# Patient Record
Sex: FEMALE | Race: OTHER | HISPANIC OR LATINO
[De-identification: names, ages, dates, MRNs, and addresses within clinical notes are randomized per-mention and may not be internally consistent; named-entity substitution may affect disease eponyms.]

---

## 2018-12-06 ENCOUNTER — APPOINTMENT (OUTPATIENT)
Dept: SURGERY | Facility: CLINIC | Age: 78
End: 2018-12-06
Payer: MEDICARE

## 2018-12-06 VITALS
HEART RATE: 66 BPM | HEIGHT: 64 IN | SYSTOLIC BLOOD PRESSURE: 171 MMHG | DIASTOLIC BLOOD PRESSURE: 88 MMHG | BODY MASS INDEX: 23.9 KG/M2 | TEMPERATURE: 97.8 F | WEIGHT: 140 LBS | OXYGEN SATURATION: 100 %

## 2018-12-06 PROCEDURE — 99203 OFFICE O/P NEW LOW 30 MIN: CPT

## 2018-12-10 ENCOUNTER — OUTPATIENT (OUTPATIENT)
Dept: OUTPATIENT SERVICES | Facility: HOSPITAL | Age: 78
LOS: 1 days | End: 2018-12-10
Payer: MEDICARE

## 2018-12-10 VITALS
DIASTOLIC BLOOD PRESSURE: 87 MMHG | SYSTOLIC BLOOD PRESSURE: 167 MMHG | RESPIRATION RATE: 16 BRPM | WEIGHT: 138.01 LBS | OXYGEN SATURATION: 100 % | HEART RATE: 71 BPM | TEMPERATURE: 98 F | HEIGHT: 59 IN

## 2018-12-10 DIAGNOSIS — Z01.818 ENCOUNTER FOR OTHER PREPROCEDURAL EXAMINATION: ICD-10-CM

## 2018-12-10 DIAGNOSIS — Z98.890 OTHER SPECIFIED POSTPROCEDURAL STATES: Chronic | ICD-10-CM

## 2018-12-10 DIAGNOSIS — N60.02 SOLITARY CYST OF LEFT BREAST: ICD-10-CM

## 2018-12-10 DIAGNOSIS — Z98.49 CATARACT EXTRACTION STATUS, UNSPECIFIED EYE: Chronic | ICD-10-CM

## 2018-12-10 DIAGNOSIS — N60.09 SOLITARY CYST OF UNSPECIFIED BREAST: Chronic | ICD-10-CM

## 2018-12-10 DIAGNOSIS — N32.9 BLADDER DISORDER, UNSPECIFIED: Chronic | ICD-10-CM

## 2018-12-10 DIAGNOSIS — Z90.710 ACQUIRED ABSENCE OF BOTH CERVIX AND UTERUS: Chronic | ICD-10-CM

## 2018-12-10 PROCEDURE — G0463: CPT

## 2018-12-10 RX ORDER — SODIUM CHLORIDE 9 MG/ML
3 INJECTION INTRAMUSCULAR; INTRAVENOUS; SUBCUTANEOUS EVERY 8 HOURS
Qty: 0 | Refills: 0 | Status: DISCONTINUED | OUTPATIENT
Start: 2018-12-14 | End: 2018-12-22

## 2018-12-10 NOTE — H&P PST ADULT - NEGATIVE ENMT SYMPTOMS
no dry mouth/no nasal discharge/no ear pain/no hearing difficulty/no tinnitus/no sinus symptoms/no nasal congestion/no vertigo

## 2018-12-10 NOTE — H&P PST ADULT - PSH
Bladder disorder    Breast cyst  x2, right breast, x1 in left breast  H/O breast biopsy  Left  H/O thyroidectomy    H/O umbilical hernia repair    History of tympanoplasty of right ear    S/P cataract surgery  Bilateral  S/P AYO-BSO

## 2018-12-10 NOTE — H&P PST ADULT - MUSCULOSKELETAL
detailed exam no joint swelling/no joint erythema/ROM intact/normal strength/no joint warmth/no calf tenderness details…

## 2018-12-10 NOTE — H&P PST ADULT - NSANTHOSAYNRD_GEN_A_CORE
No. [place] screening performed.  STOP BANG Legend: 0-2 = LOW Risk; 3-4 = INTERMEDIATE Risk; 5-8 = HIGH Risk

## 2018-12-13 ENCOUNTER — TRANSCRIPTION ENCOUNTER (OUTPATIENT)
Age: 78
End: 2018-12-13

## 2018-12-14 ENCOUNTER — OUTPATIENT (OUTPATIENT)
Dept: OUTPATIENT SERVICES | Facility: HOSPITAL | Age: 78
LOS: 1 days | End: 2018-12-14
Payer: MEDICARE

## 2018-12-14 ENCOUNTER — RESULT REVIEW (OUTPATIENT)
Age: 78
End: 2018-12-14

## 2018-12-14 VITALS
DIASTOLIC BLOOD PRESSURE: 74 MMHG | HEART RATE: 66 BPM | SYSTOLIC BLOOD PRESSURE: 138 MMHG | OXYGEN SATURATION: 98 % | TEMPERATURE: 98 F | RESPIRATION RATE: 16 BRPM

## 2018-12-14 VITALS
WEIGHT: 138.01 LBS | OXYGEN SATURATION: 100 % | RESPIRATION RATE: 16 BRPM | HEART RATE: 58 BPM | TEMPERATURE: 98 F | HEIGHT: 59 IN | DIASTOLIC BLOOD PRESSURE: 66 MMHG | SYSTOLIC BLOOD PRESSURE: 149 MMHG

## 2018-12-14 DIAGNOSIS — N32.9 BLADDER DISORDER, UNSPECIFIED: Chronic | ICD-10-CM

## 2018-12-14 DIAGNOSIS — Z98.890 OTHER SPECIFIED POSTPROCEDURAL STATES: Chronic | ICD-10-CM

## 2018-12-14 DIAGNOSIS — Z90.710 ACQUIRED ABSENCE OF BOTH CERVIX AND UTERUS: Chronic | ICD-10-CM

## 2018-12-14 DIAGNOSIS — Z01.818 ENCOUNTER FOR OTHER PREPROCEDURAL EXAMINATION: ICD-10-CM

## 2018-12-14 DIAGNOSIS — Z98.49 CATARACT EXTRACTION STATUS, UNSPECIFIED EYE: Chronic | ICD-10-CM

## 2018-12-14 DIAGNOSIS — N60.02 SOLITARY CYST OF LEFT BREAST: ICD-10-CM

## 2018-12-14 DIAGNOSIS — N60.09 SOLITARY CYST OF UNSPECIFIED BREAST: Chronic | ICD-10-CM

## 2018-12-14 PROCEDURE — 19281 PERQ DEVICE BREAST 1ST IMAG: CPT | Mod: LT

## 2018-12-14 PROCEDURE — 19281 PERQ DEVICE BREAST 1ST IMAG: CPT

## 2018-12-14 PROCEDURE — 88307 TISSUE EXAM BY PATHOLOGIST: CPT | Mod: 26

## 2018-12-14 PROCEDURE — 88307 TISSUE EXAM BY PATHOLOGIST: CPT

## 2018-12-14 PROCEDURE — 76098 X-RAY EXAM SURGICAL SPECIMEN: CPT | Mod: 26

## 2018-12-14 PROCEDURE — 76098 X-RAY EXAM SURGICAL SPECIMEN: CPT

## 2018-12-14 PROCEDURE — 19125 EXCISION BREAST LESION: CPT | Mod: LT

## 2018-12-14 RX ORDER — ACETAMINOPHEN WITH CODEINE 300MG-30MG
1 TABLET ORAL EVERY 4 HOURS
Qty: 0 | Refills: 0 | Status: DISCONTINUED | OUTPATIENT
Start: 2018-12-14 | End: 2018-12-14

## 2018-12-14 RX ORDER — ACETAMINOPHEN WITH CODEINE 300MG-30MG
1 TABLET ORAL
Qty: 6 | Refills: 0
Start: 2018-12-14

## 2018-12-14 RX ADMIN — SODIUM CHLORIDE 3 MILLILITER(S): 9 INJECTION INTRAMUSCULAR; INTRAVENOUS; SUBCUTANEOUS at 10:04

## 2018-12-14 NOTE — ASU DISCHARGE PLAN (ADULT/PEDIATRIC). - MEDICATION SUMMARY - MEDICATIONS TO TAKE
I will START or STAY ON the medications listed below when I get home from the hospital:    acetaminophen-codeine 300 mg-30 mg oral tablet  -- 1 tab(s) by mouth every 4 hours, As needed, Moderate Pain (4 - 6) MDD:4  -- Indication: For if pain    aspirin 81 mg oral tablet  -- 1 tab(s) by mouth once a day  -- Indication: For ..    atenolol 25 mg oral tablet  -- 1 tab(s) by mouth once a day  -- Indication: For .    Fosamax 35 mg oral tablet  -- 1 tab(s) by mouth once a week  -- Indication: For .    levothyroxine 75 mcg (0.075 mg) oral tablet  -- 1 tab(s) by mouth once a day  -- Indication: For .    Enablex 15 mg oral tablet, extended release  -- 1 tab(s) by mouth once a day  -- Indication: For .    Vitamin D3 2000 intl units oral tablet  -- 1 tab(s) by mouth once a day  -- Indication: For .

## 2018-12-14 NOTE — ASU DISCHARGE PLAN (ADULT/PEDIATRIC). - CONDITIONS AT DISCHARGE
left breast needle loc biopsy pt feels comfortable. denies pain. No active bleeding. Tolerating diet. Vital signs stable. Dressing dry, clean, and intact. Fully awake, alert and oriented x 4

## 2018-12-20 LAB — SURGICAL PATHOLOGY STUDY: SIGNIFICANT CHANGE UP

## 2019-01-04 PROBLEM — E03.9 HYPOTHYROIDISM, UNSPECIFIED: Chronic | Status: ACTIVE | Noted: 2018-12-10

## 2019-01-04 PROBLEM — I10 ESSENTIAL (PRIMARY) HYPERTENSION: Chronic | Status: ACTIVE | Noted: 2018-12-10

## 2019-01-07 ENCOUNTER — APPOINTMENT (OUTPATIENT)
Dept: SURGERY | Facility: CLINIC | Age: 79
End: 2019-01-07
Payer: MEDICARE

## 2019-01-07 PROCEDURE — 99024 POSTOP FOLLOW-UP VISIT: CPT

## 2022-06-11 NOTE — H&P PST ADULT - NEGATIVE NEUROLOGICAL SYMPTOMS
R hip pain
UC flare
no tremors/no headache/no loss of sensation/no difficulty walking/no vertigo/no weakness

## 2022-10-18 NOTE — ASU DISCHARGE PLAN (ADULT/PEDIATRIC). - NS AS DC DISCHARGE ACCOMPANY
Acitretin Pregnancy And Lactation Text: This medication is Pregnancy Category X and should not be given to women who are pregnant or may become pregnant in the future. This medication is excreted in breast milk. Family

## 2023-03-20 ENCOUNTER — APPOINTMENT (OUTPATIENT)
Dept: SURGERY | Facility: CLINIC | Age: 83
End: 2023-03-20
Payer: MEDICARE

## 2023-03-20 VITALS
HEIGHT: 64 IN | DIASTOLIC BLOOD PRESSURE: 83 MMHG | WEIGHT: 143 LBS | BODY MASS INDEX: 24.41 KG/M2 | SYSTOLIC BLOOD PRESSURE: 167 MMHG | HEART RATE: 80 BPM

## 2023-03-20 DIAGNOSIS — N64.4 MASTODYNIA: ICD-10-CM

## 2023-03-20 PROCEDURE — 99203 OFFICE O/P NEW LOW 30 MIN: CPT

## 2023-03-20 NOTE — HISTORY OF PRESENT ILLNESS
[de-identified] :  Patient is a 82 year -old female  who was referred by Dr. Iwona Carranza with the chief complaint of having a Right  breast  pain for 1 week.. She  denies any trauma and She has no nipple discharge. She  denies any fever, night sweats or loss of appetite.    There is no family history of breast carcinoma.   Menarche at age 12 -4  para-4 and her last menstrual period was when she was in her 40s before hysterectomy. Patient is on no hormonal replacement therapy.  she reports her last imaging many years ago\par PERTINENT HISTORY: \par Patient is s/p  Excision of left breast mass with  preop needle localization on 2018. 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 \par \par   [de-identified] : \par \par  .

## 2023-03-20 NOTE — PHYSICAL EXAM
[Alert] : alert [Oriented to Person] : oriented to person [Oriented to Place] : oriented to place [Oriented to Time] : oriented to time [Calm] : calm [de-identified] : She  is alert, well-groomed, and in NAD\par   [de-identified] : anicteric.  Nasal mucosa pink, septum midline. Oral mucosa pink.  Tongue midline, Pharynx without exudates.\par   [de-identified] : Neck supple. Trachea midline. Thyroid isthmus barely palpable, lobes not felt.\par   [de-identified] : left breast periareolar well healed scar. No chest deformity. Breast are symmetric, Normal contours. No nodules, masses, tenderness, or axillary or supraclavicular  adenopathy. No nipple discharge. no skin retraction \par

## 2023-03-20 NOTE — PLAN
[FreeTextEntry1] : Ms. COOL  is presenting  today for an evaluation .  she is doing well and offers no complaints.  Results of  her recent  imaging and physical examination findings were discussed in details.   She  was advised to have BL             breast US and Mammogram and return after the tests. Importance of monthly self-breast examination was reinforced.  Patient's questions and concerns addressed to patient's satisfaction.\par \par Vitamin E daily for breast pain \par Avoid caffein and Chocolates to prevent breast pain

## 2023-03-20 NOTE — CONSULT LETTER
[Dear  ___] : Dear  [unfilled], [Consult Letter:] : I had the pleasure of evaluating your patient, [unfilled]. [Please see my note below.] : Please see my note below. [Consult Closing:] : Thank you very much for allowing me to participate in the care of this patient.  If you have any questions, please do not hesitate to contact me. [Sincerely,] : Sincerely, [FreeTextEntry3] : Srinivasa Solitario MD, FACS

## 2023-04-17 ENCOUNTER — APPOINTMENT (OUTPATIENT)
Dept: SURGERY | Facility: CLINIC | Age: 83
End: 2023-04-17
Payer: MEDICARE

## 2023-04-17 VITALS
BODY MASS INDEX: 23.9 KG/M2 | HEIGHT: 64 IN | HEART RATE: 64 BPM | WEIGHT: 140 LBS | DIASTOLIC BLOOD PRESSURE: 82 MMHG | SYSTOLIC BLOOD PRESSURE: 165 MMHG

## 2023-04-17 PROCEDURE — 99213 OFFICE O/P EST LOW 20 MIN: CPT

## 2023-04-17 NOTE — DATA REVIEWED
[FreeTextEntry1] : Patient Name\par JENNIFER COOL\par Date of Birth\par 1940\par Procedure\par MA 3D DIGITAL SCREENING MAMMOGRAPHY\par Study Date & Time\par 2023-04-04 4:37 PM\par Patient ID\par [id number]\par Accession Number\par [id number]\par Referring Physician\par FRANCISCO GALICIA\par Institution Name\par [facility]\par Report\par RADIOLOGY REPORT\par FINDINGS\par FINDING\par EXAM: MA 3D DIGITAL SCREENING MAMMOGRAPHY, US SCREENING BREAST BILATERAL\par Indication: Screening. History of multiple sonographic findings.\par Date of last clinical exam: Longer than one year ago\par Breast Cancer Risk:\par NCI 5 year:2.2%\par NCI Lifetime:2.7%\par Comparison: Comparison is made to prior studies dating back to 4/28/2015.\par 3D tomosynthesis CC and MLO digital mammographic views of the breast were obtained. Composite views\par (c-views) were acquired. The images were processed with computer-aided detection system.\par The breast tissue is heterogeneously dense, which may obscure small masses. Bilateral breast masses\par are again seen including adjacent masses in the superior right breast. Nodular asymmetry associated\par with coarse calcification in the left breast is stable. Nodular asymmetry in the medial right breast\par appears larger and may correspond to sonographic finding at 3:00. Nodular asymmetry in the superior\par left breast may correspond to 12:00 periareolar finding. There is no new dominant mass, suspicious\par microcalcification or architectural distortion in the left breast. The axillary regions are within\par normal limits.\par Subsequent bilateral breast ultrasound was performed with a linear transducer in a round the clock\par fashion including the axilla.\par Right breast:\par 12:00, periareolar region, new mass is seen with angular margin and associated vascularity, this is\par suspicious, recommend sampling with ultrasound-guided core biopsy\par 3:00, periareolar region, 7 mm mass is taller than wide biopsy is recommended.\par 9:00, periareolar region, 9 mm mass is stable\par 9:00 3 cm from the nipple 5 mm mass is stable and adjacent mass also stable\par 11:00, periareolar region 7 mm probable cyst with debris\par 4/14/23, 9:34 AM [employer]\par [email or web address]\par Left breast:\par 12:00 periareolar region, 4 mm mass is stable\par 3:00 2 cm from the nipple, 7 mm mass is stable\par 3:00, 1 cm from the nipple, 8 mm mass is larger\par 4:00 periareolar region, 4 mm probable cyst with debris is essentially stable\par 6:00 periareolar region, 1 cm mass is stable\par 8:00 2 cm from the nipple, 7 mm mass stable\par Impression:\par Right breast mass 12:00 periareolar region reflects a change. Recommend sampling with\par ultrasound-guided core biopsy.\par Right breast mass 3:00 slightly taller than wide recommend ultrasound-guided core biopsy.\par These may correspond to nodular asymmetries on mammogram right breast.\par Multiple additional breast masses which may also reflect papilloma but essentially stable. Left\par breast 3:00 slightly larger, recommend short-term follow-up if biopsy results are benign.\par BI-RADS CATEGORY: 4-suspicious\par Recommendation: Ultrasound Biopsy Right breast 12:00 and 3:00 with clip marker placement and\par postprocedure mammogram. Diagnostic evaluation right breast mass at 3:00 recommended at that time to\par assess the need for biopsy. Short-term follow-up of additional sonographic findings including left\par breast 3:00.\par Message left with Ngozi in Dr. FRANCISCO GALICIA MD's office on 4/6/2023 10:38 AM\par A letter has been sent to the patient with the above recommendations. Patient has been added into a\par reminder system with a target due date for their next mammogram.\par Approximately 10% of breast carcinomas are not radiographically detectable. A negative report should\par not delay biopsy if a clinically suspicious mass is present. Dense breasts may obscure an underlying\par neoplasm. If NCI risk has been calculated, the results are based on information provided by the\par patient.\par BIRADS 0: Incomplete - Need Additional Imaging Evaluation and/or Prior Mammograms for Comparison\par BIRADS 1: Negative\par BIRADS 2: Benign\par BIRADS 3: Probably Benign\par BIRADS 4a: Low Suspicion of Malignancy\par BIRADS 4b: Intermediate Suspicion of Malignancy\par BIRADS 4c: Moderately Suspicious of Malignancy\par BIRADS 5: Highly Suspicious of Malignancy\par BIRADS 6: Known Malignancy\par The [place] Department of Health requires us to indicate the date of the patient's last\par clinical breast examination.\par Electronically signed by: Jacqui Rapp MD 4/6/2023 10:41 AM\par Workstation: [id number]\par 4/14/23, 9:34 AM [employer]\par [email or web address] 3/3\par Electronically Signed By: Jacqui Rapp MD\par Sign Date: 06-APR-23[facility]

## 2023-04-17 NOTE — PHYSICAL EXAM
[Alert] : alert [Oriented to Person] : oriented to person [Oriented to Place] : oriented to place [Oriented to Time] : oriented to time [Calm] : calm [de-identified] : She  is alert, well-groomed, and in NAD\par   [de-identified] : anicteric.  Nasal mucosa pink, septum midline. Oral mucosa pink.  Tongue midline, Pharynx without exudates.\par   [de-identified] : Neck supple. Trachea midline. Thyroid isthmus barely palpable, lobes not felt.\par   [de-identified] : left breast periareolar well healed scar. No chest deformity. Breast are symmetric, Normal contours. No nodules, masses, tenderness, or axillary or supraclavicular  adenopathy. No nipple discharge. no skin retraction

## 2023-04-17 NOTE — HISTORY OF PRESENT ILLNESS
[de-identified] : This is  a 82 year   old patient presenting today for a breast exam and to discuss the results of her recent  breast imaging. Patient had a BL breast US and   BL breast Mammogram on 2023 Deemed BIRADS category 4 and showed Right breast mass 12:00 periareolar region mass and Right breast mass 3:00 slightly taller than wide.   ultrasound-guided core biopsy recommended for both suspicious areas. Ms. COOL denies any trauma and she has no nipple discharge. Patient denies any fever, night sweats or loss of appetite. \par \par PERTINENT HISTORY: \par Patient is s/p Excision of left breast mass with preop needle localization on 2018. 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  There is no family history of breast carcinoma. Menarche at age 12 -4 para-4 and her last menstrual period was when she was in her 40s before hysterectomy. Patient is on no hormonal replacement therapy.

## 2023-04-17 NOTE — PLAN
[FreeTextEntry1] : Ms. COOL  is presenting  today for an evaluation .  she is doing well and offers no complaints.  Results of  her recent  imaging and physical examination findings were discussed in details.   She  was advised to have US guided    Right     breast  biopsy x 2 and return after the tests. Importance of monthly self-breast examination was reinforced.  Patient's questions and concerns addressed to patient's satisfaction.\par \par

## 2023-05-09 ENCOUNTER — NON-APPOINTMENT (OUTPATIENT)
Age: 83
End: 2023-05-09

## 2023-05-22 ENCOUNTER — APPOINTMENT (OUTPATIENT)
Dept: SURGERY | Facility: CLINIC | Age: 83
End: 2023-05-22

## 2023-06-01 ENCOUNTER — APPOINTMENT (OUTPATIENT)
Dept: SURGERY | Facility: CLINIC | Age: 83
End: 2023-06-01
Payer: MEDICARE

## 2023-06-01 VITALS
DIASTOLIC BLOOD PRESSURE: 83 MMHG | HEART RATE: 66 BPM | HEIGHT: 61 IN | SYSTOLIC BLOOD PRESSURE: 157 MMHG | WEIGHT: 143 LBS | BODY MASS INDEX: 27 KG/M2

## 2023-06-01 PROCEDURE — 99213 OFFICE O/P EST LOW 20 MIN: CPT

## 2023-06-01 NOTE — DATA REVIEWED
[FreeTextEntry1] : Patient Name\par JENNIFER COOL\par Date of Birth\par 1940\par Procedure\par US BREAST BIOPSY\par Study Date & Time\par 2023-04-27 10:35 AM\par Patient ID\par [id number]\par Accession Number\par [id number]\par Referring Physician\par FRANCISCO GALICIA\par Institution Name\par [facility]\par Report\par RADIOLOGY REPORT\par FINDINGS\par FINDING\par EXAMS: US BREAST BIOPSY, MA 3D DIGITAL DIAGNOSTIC MAMMOGRAPHY (RIGHT BREAST)\par HISTORY: 83 years old female with 2 indeterminate lesions in the right breast. The patient presents\par for ultrasound guided core biopsy.\par PROCEDURE: Ultrasound guided core biopsy of the right breast (2 sites)\par The risks, benefits and alternatives to the procedure were discussed with the patient and the\par patient's questions were answered. Written informed consent was obtained.\par Targeted ultrasound redemonstrates indeterminate hypoechoic lesions at right 12:00 periareolar and\par right 3:00 periareolar, both irregular in shape. The lesions were targeted for ultrasound-guided\par biopsy.\par The 3:00 periareolar lesion was first targeted. The patient was positioned and the breast was\par prepped for the procedure using standard aseptic technique. Local anesthesia was achieved with\par injection of 1% lidocaine in the skin/superficial tissues and the deeper tissues. A 14-gauge spring\par activated biopsy device was advanced to the preselected target through a 13-gauge coaxial guide. 4\par biopsy specimens were obtained. The tissue specimens were submitted to pathology. A coil clip marker\par was then deployed at the biopsy site.\par The right 12:00 periareolar lesion was next sampled using identical technique to that described\par above. 3 biopsy specimens were obtained. A venus clip marker was deployed at the biopsy site.\par Please note that samples from each site were inadvertently combined into one specimen jar. This was\par discussed with pathology and they were advised to run and result each sample individually.\par Post procedure digital mammogram demonstrates both markers in their expected locations with the coil\par clip in the region of the previously described medial nodular asymmetry confirming correspondence.\par The patient tolerated the procedure well without immediate complication. The sites of biopsy were\par held in compression for several minutes and the needle entrance site was closed using Steri strips.\par Pain control was adequate and the patient was assessed and appropriate for discharge. Home care\par instructions for this procedure were reviewed with the patient. All patient questions were answered.\par \par IMPRESSION:\par Ultrasound guided core biopsy of 2 lesions in the right breast. Please note that specimens from each\par site were inadvertently combined into one pathology specimen jar. Pathology was notified and\par requested to analyze and result each of 7 samples individually. One sample likely fragmented, as 8\par samples were resulted below:\par Final pathology results:\par A: Sclerosed intraductal papilloma\par B: Sclerosed intraductal papilloma\par C: Sclerosed intraductal papilloma\par D: Benign breast tissue with stromal fibrosis, focally with features suggestive of sclerosed\par intraductal papilloma\par E: Sclerosed intraductal papilloma\par F: Sclerosed intraductal papilloma\par G: Benign breast tissue with stromal fibrosis, focally with features suggestive of sclerosing\par intraductal papilloma\par H: Sclerosed intraductal papilloma\par Pathology shows atypical/high risk findings at both sites, despite inadvertent combination of the\par samples. The results are concordant with imaging at each site.\par Please see separate pathology report for additional details.\par Pathology results are concordant with imaging findings at each site.\par BI-RADS CATEGORY: 4 - Suspicious Abnormality\par RECOMMENDATION:\par Recommendation: Surgical consultation recommended at each site due to the presence of sclerosed\par intraductal papillomas. Sampling of additional findings at right 9:00 periareolar and left 3:00, 1\par cm from the nipple is recommended prior to any surgical intervention.\par Findings were phoned to the office of Dr. Galicia and were discussed with nurse practitioner\par Tere.\par Electronically signed by: Paradise Donato MD 5/3/2023 2:56 PM\par Workstation: [id number]\par Electronically Signed By: Paradise Donato MD\par Sign Date: 03-MAY-23

## 2023-06-01 NOTE — PHYSICAL EXAM
[Alert] : alert [Oriented to Person] : oriented to person [Oriented to Place] : oriented to place [Oriented to Time] : oriented to time [Calm] : calm [de-identified] : She  is alert, well-groomed, and in NAD\par   [de-identified] : anicteric.  Nasal mucosa pink, septum midline. Oral mucosa pink.  Tongue midline, Pharynx without exudates.\par   [de-identified] : Neck supple. Trachea midline. Thyroid isthmus barely palpable, lobes not felt.\par   [de-identified] : left breast periareolar well healed scar. No chest deformity. Breast are symmetric, Normal contours. No nodules, masses, tenderness, or axillary or supraclavicular  adenopathy. No nipple discharge. no skin retraction

## 2023-06-01 NOTE — PLAN
[FreeTextEntry1] : Ms. COOL  is presenting  today for an evaluation .  she is doing well and offers no complaints.  Results of  her recent  Biopsy and physical examination findings were discussed in details.   She  was advised to have BL              breast US guided biopsy  and return after the tests. Importance of monthly self-breast examination was reinforced.  Patient's questions and concerns addressed to patient's satisfaction.\par

## 2023-06-01 NOTE — ASSESSMENT
[FreeTextEntry1] : Impression: right breast masses - intraductal papilloma as per biopsy results\par patient has additional areas of concern in the BL breast

## 2023-06-01 NOTE — HISTORY OF PRESENT ILLNESS
[de-identified] : This is a 82 year old patient presenting today for a breast exam and to discuss the results of her recent breast imaging. Patient had a BL breast US and BL breast Mammogram on 2023 Deemed BIRADS category 4 and showed Right breast mass 12:00 periareolar region mass and Right breast mass 3:00 slightly taller than wide. Ms. COOL  is s/p US guided right breast biopsy  2 lesions  on 2023. As per report   specimens from each site were inadvertently combined into one pathology specimen jar.  Patient's pathology results were  consistent with Sclerosed intraductal papilloma. patient was also found to have additional findings at right 9:00 periareolar and left 3:00, 1 cm from the nipple.   Ms. COOL denies any trauma and she has no nipple discharge. Patient denies any fever, night sweats or loss of appetite.   \par \par PERTINENT HISTORY: \par Patient is s/p Excision of left breast mass with preop needle localization on 2018. 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 There is no family history of breast carcinoma. Menarche at age 12 -4 para-4 and her last menstrual period was when she was in her 40s before hysterectomy. Patient is on no hormonal replacement therapy. \par  [de-identified] :  Patient reports no interval changes to her overall health status or medical history

## 2023-06-26 ENCOUNTER — APPOINTMENT (OUTPATIENT)
Dept: SURGERY | Facility: CLINIC | Age: 83
End: 2023-06-26
Payer: MEDICARE

## 2023-06-26 VITALS
DIASTOLIC BLOOD PRESSURE: 78 MMHG | BODY MASS INDEX: 26.62 KG/M2 | HEIGHT: 61 IN | WEIGHT: 141 LBS | HEART RATE: 71 BPM | SYSTOLIC BLOOD PRESSURE: 153 MMHG

## 2023-06-26 PROCEDURE — 99213 OFFICE O/P EST LOW 20 MIN: CPT

## 2023-06-26 NOTE — HISTORY OF PRESENT ILLNESS
[de-identified] : This is a 82 year old patient presenting today for a breast exam and to discuss the results of her recent breast imaging. Patient had a BL breast US and BL breast Mammogram on 2023 Deemed BIRADS category 4 and showed Right breast mass 12:00 periareolar region mass and Right breast mass 3:00 slightly taller than wide. Ms. COOL  is s/p US guided right breast biopsy  2 lesions  on 2023. As per report   specimens from each site were inadvertently combined into one pathology specimen jar.  Patient's pathology results were  consistent with Sclerosed intraductal papilloma. patient was also found to have additional findings at right 9:00 periareolar and left 3:00, 1 cm from the nipple.   Ms. COOL  is s/p US guided biopsy right breast and left breast on 06/15/2023. Patient's pathology results were  consistent with Right 9:00: Sclerosing intraductal papilloma, previous biopsy site changes  and Left 3:00: Sclerosing papillary lesion.  Ms. COOL denies any trauma and she has no nipple discharge. Patient denies any fever, night sweats or loss of appetite. \par \par PERTINENT HISTORY: \par Patient is s/p Excision of left breast mass with preop needle localization on 2018. 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 There is no family history of breast carcinoma. Menarche at age 12 -4 para-4 and her last menstrual period was when she was in her 40s before hysterectomy. Patient is on no hormonal replacement therapy. \par  [de-identified] : Patient reports no interval changes to her overall health status or medical history

## 2023-06-26 NOTE — PLAN
[FreeTextEntry1] : Ms. COOL  was told significance of findings, options, risks and benefits were explained.  Informed consent for excision right breast mass with spot localization x3 and excision left breast mass with spot localization and potential risks, benefits and alternatives (surgical options were discussed including non-surgical options or the option of no surgery) to the planned surgery were discussed in depth.  All surgical options were discussed including non-surgical treatments.  She wishes to proceed with surgery.  We will plan for surgery on at the next available date, pending any required insurance pre-certification or pre-approval. She agrees to obtain any necessary pre-operative evaluations and testing prior to surgery.\par Patient advised to seek immediate medical attention with any acute change in symptoms or with the development of any new or worsening symptoms.  Patient's questions and concerns addressed to patient's satisfaction, and patient verbalized an understanding of the information discussed.\par \par

## 2023-06-26 NOTE — DATA REVIEWED
[FreeTextEntry1] : Patient Name\par JENNIFER COOL\par Date of Birth\par 1940\par Procedure\par US BREAST BIOPSY\par Study Date & Time\par 2023-06-15 11:48 AM\par Patient ID\par [id number]\par Accession Number\par [id number]\par Referring Physician\par FRANCISCO GALICIA\par Institution Name\par [id number]\par Report\par RADIOLOGY REPORT\par FINDINGS\par FINDING\par EXAMS: US BREAST BIOPSY, MA 3D DIGITAL DIAGNOSTIC MAMMOGRAPHY, US BREAST BIOPSY (bilateral, 2 sites)\par HISTORY: 83 years old female with recently biopsied bilateral papillomas. The patient presents for\par ultrasound guided core biopsy of 2 additional lesions.\par PROCEDURE: Ultrasound guided core biopsy of the bilateral breast (2 sites)\par The risks, benefits and alternatives to the procedure were discussed with the patient and the\par patient's questions were answered. Written informed consent was obtained.\par Targeted ultrasound redemonstrates the hypoechoic nodules at right 9:00 periareolar and left 3:00, 1\par cm from the nipple. The lesions were targeted for ultrasound-guided biopsy.\par The left 3:00 lesion was first targeted. The patient was positioned and the breast was prepped for\par the procedure using standard aseptic technique. Local anesthesia was achieved with injection of 1%\par lidocaine in the skin/superficial tissues and the deeper tissues. A 14-gauge spring activated biopsy\par device was advanced to the preselected target through a 13-gauge coaxial guide. Biopsy specimens\par were obtained. The tissue specimens were submitted to pathology. A heart shaped clip marker was then\par deployed at the biopsy site.\par The right 9:00 lesion was next targeted and sampled using an identical technique to that described\par above. A heart shaped clip marker was deployed at the biopsy site.\par Post procedure digital mammogram demonstrates the both heart shaped clip markers in their expected\par locations\par The patient tolerated the procedure well without immediate complication. The site of biopsy was held\par in compression for several minutes and the needle entrance site was closed using Steri strips. Pain\par control was adequate and the patient was assessed and appropriate for discharge. Home care\par instructions for this procedure were reviewed with the patient. All patient questions were answered.\par IMPRESSION:\par \par Ultrasound guided core biopsy of 2 additional lesions bilaterally.\par Final pathology results:\par Right 9:00: Sclerosing intraductal papilloma, previous biopsy site changes\par Left 3:00: Sclerosing papillary lesion\par Pathology shows atypical/high risk findings. The results are concordant with imaging.\par Please see separate pathology report for additional details.\par Pathology results are concordant with imaging findings.\par BI-RADS CATEGORY: 4 - Suspicious Abnormality\par RECOMMENDATION:\par Recommendation: Surgical consultation for these biopsied lesions as well as previously biopsied\par papillomas previously described.\par Electronically signed by: Paradise Donato MD 6/21/2023 1:47 PM\par Workstation: [id number]\par Electronically Signed By: Paradise Donato MD\par Sign Date: 21-JUN-23[facility]

## 2023-06-26 NOTE — PHYSICAL EXAM
[Alert] : alert [Oriented to Person] : oriented to person [Oriented to Place] : oriented to place [Oriented to Time] : oriented to time [Calm] : calm [de-identified] : She  is alert, well-groomed, and in NAD\par   [de-identified] : anicteric.  Nasal mucosa pink, septum midline. Oral mucosa pink.  Tongue midline, Pharynx without exudates.\par   [de-identified] : Neck supple. Trachea midline. Thyroid isthmus barely palpable, lobes not felt.\par   [de-identified] : left breast periareolar well healed scar. No chest deformity. Breast are symmetric, Normal contours. No nodules, masses, tenderness, or axillary or supraclavicular  adenopathy. No nipple discharge. no skin retraction

## 2023-06-26 NOTE — ASSESSMENT
[FreeTextEntry1] : Impression: right breast masses - intraductal papilloma x3 \par  left breast mass- intraductal papilloma x1

## 2023-07-17 ENCOUNTER — OUTPATIENT (OUTPATIENT)
Dept: OUTPATIENT SERVICES | Facility: HOSPITAL | Age: 83
LOS: 1 days | End: 2023-07-17
Payer: MEDICARE

## 2023-07-17 VITALS
TEMPERATURE: 98 F | HEART RATE: 69 BPM | RESPIRATION RATE: 18 BRPM | DIASTOLIC BLOOD PRESSURE: 67 MMHG | OXYGEN SATURATION: 98 % | SYSTOLIC BLOOD PRESSURE: 148 MMHG | HEIGHT: 62 IN | WEIGHT: 141.1 LBS

## 2023-07-17 DIAGNOSIS — E03.9 HYPOTHYROIDISM, UNSPECIFIED: ICD-10-CM

## 2023-07-17 DIAGNOSIS — N32.9 BLADDER DISORDER, UNSPECIFIED: Chronic | ICD-10-CM

## 2023-07-17 DIAGNOSIS — Z98.890 OTHER SPECIFIED POSTPROCEDURAL STATES: Chronic | ICD-10-CM

## 2023-07-17 DIAGNOSIS — I10 ESSENTIAL (PRIMARY) HYPERTENSION: ICD-10-CM

## 2023-07-17 DIAGNOSIS — Z01.818 ENCOUNTER FOR OTHER PREPROCEDURAL EXAMINATION: ICD-10-CM

## 2023-07-17 DIAGNOSIS — N63.20 UNSPECIFIED LUMP IN THE LEFT BREAST, UNSPECIFIED QUADRANT: ICD-10-CM

## 2023-07-17 DIAGNOSIS — N60.09 SOLITARY CYST OF UNSPECIFIED BREAST: Chronic | ICD-10-CM

## 2023-07-17 DIAGNOSIS — R00.2 PALPITATIONS: ICD-10-CM

## 2023-07-17 DIAGNOSIS — Z90.710 ACQUIRED ABSENCE OF BOTH CERVIX AND UTERUS: Chronic | ICD-10-CM

## 2023-07-17 DIAGNOSIS — Z98.49 CATARACT EXTRACTION STATUS, UNSPECIFIED EYE: Chronic | ICD-10-CM

## 2023-07-17 RX ORDER — ALENDRONATE SODIUM 70 MG/1
1 TABLET ORAL
Qty: 0 | Refills: 0 | DISCHARGE

## 2023-07-17 RX ORDER — DARIFENACIN HYDROBROMIDE 15 MG/1
1 TABLET, EXTENDED RELEASE ORAL
Qty: 0 | Refills: 0 | DISCHARGE

## 2023-07-17 NOTE — H&P PST ADULT - NSICDXPASTMEDICALHX_GEN_ALL_CORE_FT
PAST MEDICAL HISTORY:  Hypertension     Hypothyroidism     Intraductal papilloma of both breasts     Osteoporosis     Palpitations

## 2023-07-17 NOTE — H&P PST ADULT - REASON FOR ADMISSION
Pt is scheduled for Excision of Right Breast Masses with Pre-Operative Needle Localization x3, and Excision of Left Breast Mass with Pre-Operative Needle Localization on 7/28/23 with Dr. Solitario

## 2023-07-17 NOTE — H&P PST ADULT - HISTORY OF PRESENT ILLNESS
84 y/o female Palpitations, Osteoporosis, Hypothyroidism. 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 Pt c/o recent bilateral breast ultrasound and mammogram that showed right and left breast masses s/p US guided biopsy on 06/15/2023 showing right breast masses with Sclerosing intraductal papilloma x3 and left breast mass with Sclerosing intraductal papilloma x1. Pt is now scheduled for Excision of Right Breast Mass with Pre-operative Needle Localization x3 and Excision of Left Breast Mass with Pre-operative Needle Localization on 7/28/23 with Dr. Solitario.

## 2023-07-17 NOTE — H&P PST ADULT - NSICDXFAMILYHX_GEN_ALL_CORE_FT
FAMILY HISTORY:  Father  Still living? No  Family history of typhoid fever, Age at diagnosis: Age Unknown

## 2023-07-17 NOTE — H&P PST ADULT - NSICDXPROCEDURE_GEN_ALL_CORE_FT
PROCEDURES:  Excision, mass, breast, bilateral, with needle localization 17-Jul-2023 10:18:22  Clifton Bobo

## 2023-07-17 NOTE — H&P PST ADULT - PROBLEM SELECTOR PLAN 1
Pt is scheduled for Excision of Right Breast Mass with Pre-operative Needle Localization x3 and Excision of Left Breast Mass with Pre-operative Needle Localization on 7/28/23 with Dr. Solitario.     Labs drawn in PCP 07/2023- follow up for results  pending medical clearance     Pt was  instructed to stop aspirin/ecotrin and all over the counter medication including vitamins and herbal supplements one week prior to surgery   Instructions given on the use of 4% chlorhexidine wash and Pt verbalized understanding of same   Pt Instructed to have nothing by mouth starting midnight day before surgery  Patient is to expect a phone call day before surgery between the hours of 430- 630pm giving arrival time for surgery   Written and verbal preoperative instructions given to patient with understanding verbalized.     Patient today with STOP bang score 2  Low  risk for CHEYENNE

## 2023-07-17 NOTE — H&P PST ADULT - ASSESSMENT
82 y/o female found to have right breast masses with Sclerosing intraductal papilloma x3 and left breast mass with Sclerosing intraductal papilloma x1. Pt is now scheduled for Excision of Right Breast Mass with Pre-operative Needle Localization x3 and Excision of Left Breast Mass with Pre-operative Needle Localization on 7/28/23 with Dr. Solitario.     SLOAN GONZÁLES 2

## 2023-07-17 NOTE — H&P PST ADULT - NSICDXPASTSURGICALHX_GEN_ALL_CORE_FT
PAST SURGICAL HISTORY:  Bladder disorder     Breast cyst x2, right breast, x1 in left breast    H/O breast biopsy Left    H/O thyroidectomy     H/O umbilical hernia repair     History of tympanoplasty of right ear     S/P cataract surgery Bilateral    S/P AYO-BSO

## 2023-07-18 PROCEDURE — G0463: CPT

## 2023-07-27 ENCOUNTER — TRANSCRIPTION ENCOUNTER (OUTPATIENT)
Age: 83
End: 2023-07-27

## 2023-07-28 ENCOUNTER — APPOINTMENT (OUTPATIENT)
Dept: SURGERY | Facility: HOSPITAL | Age: 83
End: 2023-07-28

## 2023-07-28 ENCOUNTER — OUTPATIENT (OUTPATIENT)
Dept: OUTPATIENT SERVICES | Facility: HOSPITAL | Age: 83
LOS: 1 days | End: 2023-07-28
Payer: MEDICARE

## 2023-07-28 ENCOUNTER — TRANSCRIPTION ENCOUNTER (OUTPATIENT)
Age: 83
End: 2023-07-28

## 2023-07-28 ENCOUNTER — RESULT REVIEW (OUTPATIENT)
Age: 83
End: 2023-07-28

## 2023-07-28 VITALS
RESPIRATION RATE: 15 BRPM | HEART RATE: 59 BPM | SYSTOLIC BLOOD PRESSURE: 148 MMHG | TEMPERATURE: 98 F | OXYGEN SATURATION: 96 % | DIASTOLIC BLOOD PRESSURE: 64 MMHG

## 2023-07-28 VITALS
SYSTOLIC BLOOD PRESSURE: 146 MMHG | RESPIRATION RATE: 16 BRPM | WEIGHT: 141.1 LBS | HEART RATE: 67 BPM | TEMPERATURE: 98 F | DIASTOLIC BLOOD PRESSURE: 75 MMHG | OXYGEN SATURATION: 95 % | HEIGHT: 62 IN

## 2023-07-28 DIAGNOSIS — Z90.710 ACQUIRED ABSENCE OF BOTH CERVIX AND UTERUS: Chronic | ICD-10-CM

## 2023-07-28 DIAGNOSIS — Z98.890 OTHER SPECIFIED POSTPROCEDURAL STATES: Chronic | ICD-10-CM

## 2023-07-28 DIAGNOSIS — N60.09 SOLITARY CYST OF UNSPECIFIED BREAST: Chronic | ICD-10-CM

## 2023-07-28 DIAGNOSIS — Z98.49 CATARACT EXTRACTION STATUS, UNSPECIFIED EYE: Chronic | ICD-10-CM

## 2023-07-28 DIAGNOSIS — N63.20 UNSPECIFIED LUMP IN THE LEFT BREAST, UNSPECIFIED QUADRANT: ICD-10-CM

## 2023-07-28 DIAGNOSIS — N32.9 BLADDER DISORDER, UNSPECIFIED: Chronic | ICD-10-CM

## 2023-07-28 PROCEDURE — ZZZZZ: CPT

## 2023-07-28 PROCEDURE — 19282 PERQ DEVICE BREAST EA IMAG: CPT

## 2023-07-28 PROCEDURE — 88305 TISSUE EXAM BY PATHOLOGIST: CPT

## 2023-07-28 PROCEDURE — 88307 TISSUE EXAM BY PATHOLOGIST: CPT

## 2023-07-28 PROCEDURE — 76098 X-RAY EXAM SURGICAL SPECIMEN: CPT

## 2023-07-28 PROCEDURE — 88307 TISSUE EXAM BY PATHOLOGIST: CPT | Mod: 26

## 2023-07-28 PROCEDURE — 88305 TISSUE EXAM BY PATHOLOGIST: CPT | Mod: 26

## 2023-07-28 PROCEDURE — 76098 X-RAY EXAM SURGICAL SPECIMEN: CPT | Mod: 26

## 2023-07-28 PROCEDURE — 19282 PERQ DEVICE BREAST EA IMAG: CPT | Mod: RT

## 2023-07-28 PROCEDURE — 19281 PERQ DEVICE BREAST 1ST IMAG: CPT

## 2023-07-28 PROCEDURE — 19126 EXCISION ADDL BREAST LESION: CPT | Mod: RT

## 2023-07-28 PROCEDURE — 19125 EXCISION BREAST LESION: CPT | Mod: 50

## 2023-07-28 PROCEDURE — 19281 PERQ DEVICE BREAST 1ST IMAG: CPT | Mod: LT

## 2023-07-28 RX ORDER — FENTANYL CITRATE 50 UG/ML
25 INJECTION INTRAVENOUS
Refills: 0 | Status: DISCONTINUED | OUTPATIENT
Start: 2023-07-28 | End: 2023-07-28

## 2023-07-28 RX ORDER — LEVOTHYROXINE SODIUM 125 MCG
1 TABLET ORAL
Qty: 0 | Refills: 0 | DISCHARGE

## 2023-07-28 RX ORDER — SODIUM CHLORIDE 9 MG/ML
3 INJECTION INTRAMUSCULAR; INTRAVENOUS; SUBCUTANEOUS EVERY 8 HOURS
Refills: 0 | Status: DISCONTINUED | OUTPATIENT
Start: 2023-07-28 | End: 2023-07-28

## 2023-07-28 RX ORDER — CHOLECALCIFEROL (VITAMIN D3) 125 MCG
1 CAPSULE ORAL
Qty: 0 | Refills: 0 | DISCHARGE

## 2023-07-28 RX ORDER — SODIUM CHLORIDE 9 MG/ML
1000 INJECTION, SOLUTION INTRAVENOUS
Refills: 0 | Status: DISCONTINUED | OUTPATIENT
Start: 2023-07-28 | End: 2023-07-28

## 2023-07-28 RX ORDER — ATENOLOL 25 MG/1
1 TABLET ORAL
Qty: 0 | Refills: 0 | DISCHARGE

## 2023-07-28 RX ORDER — ASPIRIN/CALCIUM CARB/MAGNESIUM 324 MG
1 TABLET ORAL
Qty: 0 | Refills: 0 | DISCHARGE

## 2023-07-28 RX ORDER — FENTANYL CITRATE 50 UG/ML
50 INJECTION INTRAVENOUS
Refills: 0 | Status: DISCONTINUED | OUTPATIENT
Start: 2023-07-28 | End: 2023-07-28

## 2023-07-28 NOTE — BRIEF OPERATIVE NOTE - NSICDXBRIEFPROCEDURE_GEN_ALL_CORE_FT
PROCEDURES:  Excision of breast mass with needle localization 28-Jul-2023 12:41:30 right x3 Michelle Donovan  Excision of breast mass with needle localization 28-Jul-2023 12:41:48 left x1 Michelle Donovan

## 2023-07-28 NOTE — BRIEF OPERATIVE NOTE - NSICDXBRIEFPOSTOP_GEN_ALL_CORE_FT
POST-OP DIAGNOSIS:  Mass of left breast 28-Jul-2023 12:43:01  Michelle Donovan  Breast mass, right 28-Jul-2023 12:43:07  Michelle Donovan

## 2023-07-28 NOTE — ASU DISCHARGE PLAN (ADULT/PEDIATRIC) - NS MD DC FALL RISK RISK
For information on Fall & Injury Prevention, visit: [email or web address] OR  [email or web address] OR  https://www.cdc.gov/steadi/patient.html

## 2023-07-28 NOTE — ASU DISCHARGE PLAN (ADULT/PEDIATRIC) - CARE PROVIDER_API CALL
Srinivasa Solitario  Surgery  [address] [facility] [address] [facility]  [place] [address]  Phone: [phone]  Fax: [phone]  Follow Up Time:

## 2023-07-28 NOTE — ASU DISCHARGE PLAN (ADULT/PEDIATRIC) - ASU DC SPECIAL INSTRUCTIONSFT
Follow up with Dr. Solitario  in 2 weeks in office     You may take Motrin 600mg-800mg (with food) every 6 hours or Tylenol 650mg-1000mg every 6 hours as needed for mild pain. Stagger one medication 3 hours after the other for maximum pain control. Maximum daily dose of Tylenol should not exceed 4000mg/day

## 2023-07-28 NOTE — BRIEF OPERATIVE NOTE - NSICDXBRIEFPREOP_GEN_ALL_CORE_FT
PRE-OP DIAGNOSIS:  Mass of left breast 28-Jul-2023 12:42:46  Michelle Donovan  Breast mass, right 28-Jul-2023 12:42:57  Michelle Donovan

## 2023-07-28 NOTE — ASU DISCHARGE PLAN (ADULT/PEDIATRIC) - PROCEDURE
Elective Excision of Right Breast Mass with Needle Localization x3, Excision of Left Breast Mass with Needle Localization

## 2023-08-01 PROBLEM — D24.2 BENIGN NEOPLASM OF LEFT BREAST: Chronic | Status: ACTIVE | Noted: 2023-07-17

## 2023-08-01 PROBLEM — M81.0 AGE-RELATED OSTEOPOROSIS WITHOUT CURRENT PATHOLOGICAL FRACTURE: Chronic | Status: ACTIVE | Noted: 2023-07-17

## 2023-08-01 PROBLEM — R00.2 PALPITATIONS: Chronic | Status: ACTIVE | Noted: 2023-07-17

## 2023-08-01 LAB — SURGICAL PATHOLOGY STUDY: SIGNIFICANT CHANGE UP

## 2023-08-02 PROBLEM — N63.20 BREAST MASS, LEFT: Status: ACTIVE | Noted: 2019-01-07

## 2023-08-02 PROBLEM — N63.10 BREAST MASS, RIGHT: Status: ACTIVE | Noted: 2023-04-14

## 2023-08-07 ENCOUNTER — APPOINTMENT (OUTPATIENT)
Age: 83
End: 2023-08-07
Payer: MEDICARE

## 2023-08-07 DIAGNOSIS — N63.20 UNSPECIFIED LUMP IN THE LEFT BREAST, UNSPECIFIED QUADRANT: ICD-10-CM

## 2023-08-07 DIAGNOSIS — N63.10 UNSPECIFIED LUMP IN THE RIGHT BREAST, UNSPECIFIED QUADRANT: ICD-10-CM

## 2023-08-07 PROCEDURE — 99024 POSTOP FOLLOW-UP VISIT: CPT

## 2023-08-07 NOTE — ASSESSMENT
[FreeTextEntry1] : Ms. COOL is doing well, with excellent post-operative recovery. All surgical incisions are healing well and as expected. There is no evidence of infection or complication, and she is progressing as expected. Post-operative wound care, activity, restrictions and precautions reinforced.  pathology results were discussed in details.  Patient's questions and concerns addressed to patient's satisfaction.

## 2023-08-07 NOTE — PHYSICAL EXAM
[Alert] : alert [Oriented to Person] : oriented to person [Oriented to Place] : oriented to place [Oriented to Time] : oriented to time [Calm] : calm [de-identified] : She  is alert, well-groomed, and in NAD\par    [de-identified] : anicteric.  Nasal mucosa pink, septum midline. Oral mucosa pink.  Tongue midline, Pharynx without exudates.\par    [de-identified] : Neck supple. Trachea midline. Thyroid isthmus barely palpable, lobes not felt.\par    [de-identified] :  Surgical wounds are  healing well.   no signs of  inflammation or infection.

## 2023-08-07 NOTE — DATA REVIEWED
[FreeTextEntry1] :   	 Rashmi Accession Number : [id number] Patient:   JENNIFER COOL   Accession:                             [id number]  Collected Date/Time:                   7/28/2023 11:56 EDT Received Date/Time:                    7/28/2023 12:18 EDT  Surgical Pathology Report - Auth (Verified)  Specimen(s) Submitted 1  Right breast mass 2  Left breast mass  Final Diagnosis 1.  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 (Electronic Signature) Reported on: 08/01/23 15:10 EDT, [address] Phone: [phone]   Fax: [phone]

## 2023-08-07 NOTE — HISTORY OF PRESENT ILLNESS
[de-identified] : Ms. COOL  is s/p Excision of breast mass with needle localization x3 and left breast mass x1 on 07/28/2023.  Patient's pathology results were  consistent with BL Breast intraductal papilloma .  Today Ms. COOL offers no complaints. patient reports no fever, chills,  or  pain. Her  surgical wounds are  healing well. No signs of inflammation, infection or exudate. Patient reports good bowel movements and appetite.

## 2024-02-21 ENCOUNTER — APPOINTMENT (OUTPATIENT)
Age: 84
End: 2024-02-21
Payer: MEDICARE

## 2024-02-21 VITALS
OXYGEN SATURATION: 96 % | BODY MASS INDEX: 26.43 KG/M2 | DIASTOLIC BLOOD PRESSURE: 89 MMHG | HEART RATE: 75 BPM | HEIGHT: 61 IN | SYSTOLIC BLOOD PRESSURE: 167 MMHG | WEIGHT: 140 LBS

## 2024-02-21 DIAGNOSIS — M25.512 PAIN IN LEFT SHOULDER: ICD-10-CM

## 2024-02-21 DIAGNOSIS — G89.29 PAIN IN LEFT SHOULDER: ICD-10-CM

## 2024-02-21 DIAGNOSIS — M67.912 UNSPECIFIED DISORDER OF SYNOVIUM AND TENDON, LEFT SHOULDER: ICD-10-CM

## 2024-02-21 PROCEDURE — 99204 OFFICE O/P NEW MOD 45 MIN: CPT | Mod: 25

## 2024-02-21 PROCEDURE — 20611 DRAIN/INJ JOINT/BURSA W/US: CPT | Mod: LT

## 2024-09-19 NOTE — ASU PATIENT PROFILE, ADULT - TOBACCO USE
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   PLAN: 1. Explained to patient that it is likely that her balance and gait issues are related to the bleed that occurred in her spinal cord in 2020. 2. Explained that MRI dated 7/19/24 shows a stable appearance of the spinal cord without any new bleed noted. 3. Explained that there is a chance that there will be a bleed in the future however being that there has only been one occurrence of rupture going ahead with treatment may cause more neurological risks than benefits.  Pt to continue follow up with her doctor. 4. Will discuss at TB. 5. Pt can discuss with her PCP about adding Gabapentin to her regimen. 6. F/U with NSG PRN. 7. No surgical intervention recommended. 4. Continue supportive physical therapy.  Never smoker

## (undated) DEVICE — DRSG CURITY GAUZE SPONGE 4 X 4" 12-PLY

## (undated) DEVICE — WARMING BLANKET LOWER ADULT

## (undated) DEVICE — SUT POLYSORB 3-0 30" V-20 UNDYED

## (undated) DEVICE — SUT SILK 2-0 30" SH

## (undated) DEVICE — DRAPE LIGHT HANDLE COVER (BLUE)

## (undated) DEVICE — SUT VICRYL PLUS 4-0 27" PS-2 UNDYED

## (undated) DEVICE — PREP CHLORAPREP HI-LITE ORANGE 26ML

## (undated) DEVICE — PREP BETADINE SPONGE STICKS

## (undated) DEVICE — GLV 7 PROTEXIS (WHITE)

## (undated) DEVICE — ELCTR GROUNDING PAD ADULT COVIDIEN

## (undated) DEVICE — DRSG MASTISOL

## (undated) DEVICE — GOWN XL

## (undated) DEVICE — SOL IRR POUR H2O 500ML

## (undated) DEVICE — NDL HYPO REGULAR BEVEL 25G X 1.5" (BLUE)

## (undated) DEVICE — VENODYNE/SCD SLEEVE CALF MEDIUM

## (undated) DEVICE — DRAPE LAPAROTOMY TRANSVERSE

## (undated) DEVICE — PACK MINOR NO DRAPE

## (undated) DEVICE — DRSG TEGADERM 4X4.75"

## (undated) DEVICE — GLV 7.5 PROTEXIS (WHITE)

## (undated) DEVICE — SUT VICRYL 3-0 27" SH-1 UNDYED

## (undated) DEVICE — DRAPE 1/2 SHEET 40X57"

## (undated) DEVICE — SOL IRR POUR H2O 1500ML

## (undated) DEVICE — NDL HYPO SAFE 25G X 1.5" (ORANGE)

## (undated) DEVICE — SYR LUER LOK 10CC

## (undated) DEVICE — FOR-ESU VALLEYLAB T7E14996DX: Type: DURABLE MEDICAL EQUIPMENT

## (undated) DEVICE — SUT POLYSORB 4-0 27" P-12 UNDYED

## (undated) DEVICE — SUT SOFSILK 2-0 30" V-20